# Patient Record
Sex: FEMALE | Race: ASIAN | NOT HISPANIC OR LATINO
[De-identification: names, ages, dates, MRNs, and addresses within clinical notes are randomized per-mention and may not be internally consistent; named-entity substitution may affect disease eponyms.]

---

## 2022-03-04 ENCOUNTER — APPOINTMENT (OUTPATIENT)
Dept: UROLOGY | Facility: CLINIC | Age: 52
End: 2022-03-04
Payer: MEDICAID

## 2022-03-04 VITALS
OXYGEN SATURATION: 97 % | HEART RATE: 110 BPM | RESPIRATION RATE: 16 BRPM | SYSTOLIC BLOOD PRESSURE: 107 MMHG | WEIGHT: 120 LBS | TEMPERATURE: 97.6 F | BODY MASS INDEX: 21.26 KG/M2 | DIASTOLIC BLOOD PRESSURE: 68 MMHG | HEIGHT: 63 IN

## 2022-03-04 DIAGNOSIS — Z00.00 ENCOUNTER FOR GENERAL ADULT MEDICAL EXAMINATION W/OUT ABNORMAL FINDINGS: ICD-10-CM

## 2022-03-04 DIAGNOSIS — Z78.9 OTHER SPECIFIED HEALTH STATUS: ICD-10-CM

## 2022-03-04 DIAGNOSIS — Z87.891 PERSONAL HISTORY OF NICOTINE DEPENDENCE: ICD-10-CM

## 2022-03-04 PROCEDURE — 99204 OFFICE O/P NEW MOD 45 MIN: CPT | Mod: 25

## 2022-03-04 PROCEDURE — 52000 CYSTOURETHROSCOPY: CPT

## 2022-03-04 RX ORDER — CIPROFLOXACIN HYDROCHLORIDE 500 MG/1
500 TABLET, FILM COATED ORAL
Qty: 1 | Refills: 0 | Status: ACTIVE | COMMUNITY
Start: 2022-03-04

## 2022-03-04 RX ORDER — FLUTICASONE PROPIONATE 50 UG/1
50 SPRAY, METERED NASAL
Refills: 0 | Status: ACTIVE | COMMUNITY

## 2022-03-04 RX ORDER — CIPROFLOXACIN HYDROCHLORIDE 500 MG/1
500 TABLET, FILM COATED ORAL
Refills: 0 | Status: COMPLETED | OUTPATIENT
Start: 2022-03-04

## 2022-03-04 NOTE — LETTER BODY
[FreeTextEntry1] : Henri Bowen MD \par [address], [address], \[place] [address]\par [phone]\par \par Dear Dr. Bowen, \par \par Reason for visit: Gross hematuria. Left renal stone. Weak urine flow. \par \par This is a 51 year-old Mandarin-speaking woman with weak urine flow, left renal stone and gross hematuria referred for evaluation. The patient reports episodes of gross hematuria. Her renal ultrasound demonstrated 3 mm nonobstructing left renal stone. The patient also complains of weak urine flow.  She denies any dysuria or urinary incontinence. The patient denies any aggravating or relieving factors. The patient denies any interference of function.  All other review of systems are negative. She has no cancer in her family medical history. She has previous undergone a  section. Past medical history, family history and social history were inquired and were noncontributory to current condition. The patient was a former smoker. She does not drink alcohol. Medications and allergies were reviewed. She has no known allergies to medication. \par \par On examination, the patient is a healthy-appearing woman in no acute distress. She is alert and oriented follows commands. She  has normal mood and affect. She is normocephalic. Neck is supple. Oral no thrush. Respirations are unlabored. Abdomen is soft and nontender. Bladder is nonpalpable. No CVA tenderness. Neurologically she is grossly intact. No peripheral edema. Skin without gross abnormality.\par \par I personally reviewed ultrasound images with the patient today and images demonstrated a 3 mm nonobstructing left renal stone.  \par \par Assessment: Gross hematuria. Left renal stone. Weak urine flow.\par \par I counseled the patient on the various etiologies of gross hematuria. I discussed the risk of occult malignancy. I recommended patient obtain urinalysis, urine cytology, urine culture and chlamydia/GC amplification. I also recommended she undergo a cystoscopy today to evaluate for hematuria. I counseled the patient about the procedures. I answered the patient's questions. The risks and expected outcomes were discussed. The patient will follow up as directed and contact me with any questions or concerns.\par \par Plan: Cystoscopy. Urinalysis. Urine cytology. Urine culture. Chlamydia/GC amplification. \par \par Her hematuria evaluation was negative. There was evidence of urethral stricture. \par \par I counseled the patient. Her cystoscopy demonstrated evidence of a urethral stricture. I recommended she begin a trial of Flomax. I discussed the potential side effects of the medication. I counseled the patient on its use and side effects. If the patient develops any side effects, the patient will discontinue the medication and contact me. Risks and alternatives were discussed. I answered the patient questions. The patient will follow-up as directed and will contact me with any questions or concerns. Thank you for the opportunity to participate in the care of Ms. MIRANDA. I will keep you updated on her progress. \par \par Plan: Trial of Flomax. Follow up as directed.

## 2022-03-04 NOTE — ADDENDUM
[FreeTextEntry1] : Entered by Cesar Farris, acting as scribe for Dr. Cam Nagy.\par \par The documentation recorded by the scribe accurately reflects the service I personally performed and the decisions made by me.

## 2022-03-05 LAB
APPEARANCE: CLEAR
BACTERIA: NEGATIVE
BILIRUBIN URINE: NEGATIVE
BLOOD URINE: NEGATIVE
C TRACH RRNA SPEC QL NAA+PROBE: NOT DETECTED
COLOR: NORMAL
GLUCOSE QUALITATIVE U: NEGATIVE
HYALINE CASTS: 1 /LPF
KETONES URINE: NEGATIVE
LEUKOCYTE ESTERASE URINE: NEGATIVE
MICROSCOPIC-UA: NORMAL
N GONORRHOEA RRNA SPEC QL NAA+PROBE: NOT DETECTED
NITRITE URINE: NEGATIVE
PH URINE: 6.5
PROTEIN URINE: NEGATIVE
RED BLOOD CELLS URINE: 1 /HPF
SOURCE AMPLIFICATION: NORMAL
SPECIFIC GRAVITY URINE: 1.01
SQUAMOUS EPITHELIAL CELLS: 2 /HPF
UROBILINOGEN URINE: NORMAL
WHITE BLOOD CELLS URINE: 1 /HPF

## 2022-03-06 LAB — BACTERIA UR CULT: NORMAL

## 2022-03-07 LAB — URINE CYTOLOGY: NORMAL

## 2022-09-02 ENCOUNTER — APPOINTMENT (OUTPATIENT)
Dept: UROLOGY | Facility: CLINIC | Age: 52
End: 2022-09-02

## 2022-09-02 VITALS
BODY MASS INDEX: 25.79 KG/M2 | HEART RATE: 98 BPM | RESPIRATION RATE: 18 BRPM | DIASTOLIC BLOOD PRESSURE: 63 MMHG | HEIGHT: 66.14 IN | WEIGHT: 160.5 LBS | TEMPERATURE: 97.9 F | OXYGEN SATURATION: 99 % | SYSTOLIC BLOOD PRESSURE: 89 MMHG

## 2022-09-02 VITALS
RESPIRATION RATE: 18 BRPM | BODY MASS INDEX: 21.08 KG/M2 | WEIGHT: 119 LBS | OXYGEN SATURATION: 97 % | DIASTOLIC BLOOD PRESSURE: 56 MMHG | SYSTOLIC BLOOD PRESSURE: 89 MMHG | HEART RATE: 98 BPM | TEMPERATURE: 97.9 F

## 2022-09-02 DIAGNOSIS — R31.0 GROSS HEMATURIA: ICD-10-CM

## 2022-09-02 DIAGNOSIS — R82.90 UNSPECIFIED ABNORMAL FINDINGS IN URINE: ICD-10-CM

## 2022-09-02 DIAGNOSIS — N20.0 CALCULUS OF KIDNEY: ICD-10-CM

## 2022-09-02 PROCEDURE — 99024 POSTOP FOLLOW-UP VISIT: CPT

## 2022-09-02 RX ORDER — TAMSULOSIN HYDROCHLORIDE 0.4 MG/1
0.4 CAPSULE ORAL
Qty: 90 | Refills: 3 | Status: ACTIVE | COMMUNITY
Start: 1900-01-01 | End: 1900-01-01

## 2022-09-02 NOTE — LETTER BODY
[FreeTextEntry1] : Henri Bowen MD \par [address], [address], \[place] [address]\par [phone]\par \par Dear Dr. Bowen, \par \par Reason for visit: Gross hematuria. Left renal stone. Weak urine flow. \par \par This is a 52 year-old Mandarin-speaking woman with weak urine flow, left renal stone and gross hematuria. The patient reports episodes of gross hematuria. Her renal ultrasound demonstrated 3 mm nonobstructing left renal stone. Since she was last seen, the patient underwent a negative cystoscopy. The patient reports taking Flomax as directed without side effects or difficulty. The patient reports improvement of urine flow with medical therapy. She denies any dysuria or urinary incontinence. The patient denies any interference of function. All other review of systems are negative. She has no cancer in her family medical history. She has previous undergone a  section. Past medical history, family history and social history were inquired and were noncontributory to current condition. The patient was a former smoker. She does not drink alcohol. Medications and allergies were reviewed. She has no known allergies to medication. \par \par On examination, the patient is a healthy-appearing woman in no acute distress. She is alert and oriented follows commands. She has normal mood and affect. She is normocephalic. Neck is supple. Oral no thrush. Respirations are unlabored. Abdomen is soft and nontender. Bladder is nonpalpable. No CVA tenderness. Neurologically she is grossly intact. No peripheral edema. Skin without gross abnormality.\par \par Assessment: Gross hematuria. Left renal stone. Weak urine flow.\par \par I counseled the patient. Her recent cystoscopy was negative. I recommended she repeat urinalysis and urine cytology to ensure stability. In terms of her weak urine flow, she reports improvement of symptoms with Flomax. I recommended she continue Flomax as directed. I renewed her prescription for Flomax today. If the patient develops any side effects, the patient will discontinue the medication and contact me. I answered the patient's questions. The risks and expected outcomes were discussed. The patient will follow up as directed and contact me with any questions or concerns.\par \par Plan: Urinalysis. Urine cytology. Continue Flomax. Follow up in 1 year.

## 2022-09-02 NOTE — ADDENDUM
[FreeTextEntry1] : Entered by VERONICA PEREZ, acting as scribe for Dr. Cam Nagy.\par The documentation recorded by the scribe accurately reflects the service I personally performed and the decisions made by me.

## 2022-09-05 LAB
APPEARANCE: CLEAR
BACTERIA: NEGATIVE
BILIRUBIN URINE: NEGATIVE
BLOOD URINE: NEGATIVE
COLOR: NORMAL
GLUCOSE QUALITATIVE U: NEGATIVE
HYALINE CASTS: 0 /LPF
KETONES URINE: NEGATIVE
LEUKOCYTE ESTERASE URINE: NEGATIVE
MICROSCOPIC-UA: NORMAL
NITRITE URINE: NEGATIVE
PH URINE: 6.5
PROTEIN URINE: NEGATIVE
RED BLOOD CELLS URINE: 1 /HPF
SPECIFIC GRAVITY URINE: 1.02
SQUAMOUS EPITHELIAL CELLS: 1 /HPF
UROBILINOGEN URINE: NORMAL
WHITE BLOOD CELLS URINE: 0 /HPF

## 2022-09-10 LAB — URINE CYTOLOGY: NORMAL

## 2023-09-22 ENCOUNTER — APPOINTMENT (OUTPATIENT)
Dept: UROLOGY | Facility: CLINIC | Age: 53
End: 2023-09-22